# Patient Record
Sex: MALE | Race: BLACK OR AFRICAN AMERICAN | Employment: FULL TIME | URBAN - METROPOLITAN AREA
[De-identification: names, ages, dates, MRNs, and addresses within clinical notes are randomized per-mention and may not be internally consistent; named-entity substitution may affect disease eponyms.]

---

## 2017-01-05 ENCOUNTER — APPOINTMENT (OUTPATIENT)
Dept: OTHER | Facility: HOSPITAL | Age: 32
End: 2017-01-05
Attending: ORTHOPAEDIC SURGERY

## 2017-01-24 ENCOUNTER — HOSPITAL ENCOUNTER (OUTPATIENT)
Dept: GENERAL RADIOLOGY | Facility: HOSPITAL | Age: 32
Discharge: HOME | End: 2017-01-24
Attending: EMERGENCY MEDICINE

## 2017-01-24 ENCOUNTER — OFFICE VISIT (OUTPATIENT)
Dept: OTHER | Facility: HOSPITAL | Age: 32
End: 2017-01-24
Attending: EMERGENCY MEDICINE

## 2017-01-24 DIAGNOSIS — R52 PAIN: ICD-10-CM

## 2017-01-24 DIAGNOSIS — R52 PAIN: Primary | ICD-10-CM

## 2017-01-24 PROCEDURE — 73140 X-RAY EXAM OF FINGER(S): CPT

## 2017-02-21 ENCOUNTER — HOSPITAL ENCOUNTER (OUTPATIENT)
Dept: GENERAL RADIOLOGY | Facility: HOSPITAL | Age: 32
Discharge: HOME | End: 2017-02-21
Attending: EMERGENCY MEDICINE

## 2017-02-21 ENCOUNTER — OFFICE VISIT (OUTPATIENT)
Dept: OTHER | Facility: HOSPITAL | Age: 32
End: 2017-02-21
Attending: EMERGENCY MEDICINE
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
## 2017-02-21 PROCEDURE — 73140 X-RAY EXAM OF FINGER(S): CPT

## 2017-03-02 ENCOUNTER — HOSPITAL ENCOUNTER (OUTPATIENT)
Dept: GENERAL RADIOLOGY | Age: 32
Discharge: HOME | End: 2017-03-02
Attending: EMERGENCY MEDICINE

## 2017-03-02 ENCOUNTER — OFFICE VISIT (OUTPATIENT)
Dept: OCCUPATIONAL MEDICINE | Age: 32
End: 2017-03-02
Attending: EMERGENCY MEDICINE
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
## 2017-03-02 PROCEDURE — 73140 X-RAY EXAM OF FINGER(S): CPT

## 2017-03-03 ENCOUNTER — APPOINTMENT (OUTPATIENT)
Dept: OTHER | Facility: HOSPITAL | Age: 32
End: 2017-03-03
Attending: EMERGENCY MEDICINE

## 2019-03-17 ENCOUNTER — HOSPITAL ENCOUNTER (EMERGENCY)
Facility: HOSPITAL | Age: 34
Discharge: HOME | End: 2019-03-17
Attending: PHYSICIAN ASSISTANT
Payer: MEDICAID

## 2019-03-17 VITALS
DIASTOLIC BLOOD PRESSURE: 88 MMHG | HEART RATE: 99 BPM | BODY MASS INDEX: 33.79 KG/M2 | TEMPERATURE: 98 F | OXYGEN SATURATION: 98 % | SYSTOLIC BLOOD PRESSURE: 138 MMHG | HEIGHT: 70 IN | RESPIRATION RATE: 18 BRPM | WEIGHT: 236 LBS

## 2019-03-17 DIAGNOSIS — H60.501 ACUTE OTITIS EXTERNA OF RIGHT EAR, UNSPECIFIED TYPE: Primary | ICD-10-CM

## 2019-03-17 DIAGNOSIS — J02.9 ACUTE VIRAL PHARYNGITIS: ICD-10-CM

## 2019-03-17 DIAGNOSIS — R03.0 ELEVATED BLOOD PRESSURE READING: ICD-10-CM

## 2019-03-17 LAB — S PYO AG THROAT QL: NEGATIVE

## 2019-03-17 PROCEDURE — 99283 EMERGENCY DEPT VISIT LOW MDM: CPT

## 2019-03-17 PROCEDURE — 87430 STREP A AG IA: CPT

## 2019-03-17 RX ORDER — IBUPROFEN 600 MG/1
TABLET ORAL
Qty: 20 TABLET | Refills: 0 | Status: SHIPPED | OUTPATIENT
Start: 2019-03-17 | End: 2020-09-30

## 2019-03-17 RX ORDER — CIPROFLOXACIN AND DEXAMETHASONE 3; 1 MG/ML; MG/ML
4 SUSPENSION/ DROPS AURICULAR (OTIC) 2 TIMES DAILY
Qty: 1 BOTTLE | Refills: 0 | Status: SHIPPED | OUTPATIENT
Start: 2019-03-17 | End: 2019-03-24

## 2019-03-17 RX ORDER — IBUPROFEN 600 MG/1
600 TABLET ORAL ONCE
Status: COMPLETED | OUTPATIENT
Start: 2019-03-17 | End: 2019-03-17

## 2019-03-17 NOTE — ED NOTES
Pt states having Bilateral ear pain, rt worse than left started 6 days ago, and sore throat. Fever unknown but states he was sweating last night. Denies sick contacts. States took [employer] yesterday for pain, with some relief, but nothing today.

## 2019-03-17 NOTE — ED PROVIDER NOTES
Patient Seen in: [facility] AND [facility] Emergency Department    History   Patient presents with:  Ear Problem Pain (neurosensory)    Stated Complaint: Ear pain/sore throat     HPI    59-year-old male presents with chief complaint of sore throat.   Onset yester 98%   BMI 33.86 kg/m²     PULSE OX within normal limits on room air as interpreted by this provider. Constitutional: The patient is cooperative. Appears well-developed and well-nourished. Mild discomfort.   Psychological: Alert, No abnormalities of mood reading in the Emergency Department. They were informed of the dangers of undiagnosed and untreated hypertension.   Education regarding lifestyle modifications and the need for appropriate follow-up with their PCP to have their blood pressure re-checked wi

## 2020-09-18 ENCOUNTER — APPOINTMENT (OUTPATIENT)
Dept: GENERAL RADIOLOGY | Facility: HOSPITAL | Age: 35
End: 2020-09-18
Attending: EMERGENCY MEDICINE
Payer: COMMERCIAL

## 2020-09-18 ENCOUNTER — HOSPITAL ENCOUNTER (EMERGENCY)
Facility: HOSPITAL | Age: 35
Discharge: HOME | End: 2020-09-18
Attending: EMERGENCY MEDICINE
Payer: COMMERCIAL

## 2020-09-18 VITALS
DIASTOLIC BLOOD PRESSURE: 70 MMHG | RESPIRATION RATE: 84 BRPM | HEART RATE: 84 BPM | SYSTOLIC BLOOD PRESSURE: 130 MMHG | TEMPERATURE: 98 F

## 2020-09-18 DIAGNOSIS — M54.12 CERVICAL RADICULOPATHY: Primary | ICD-10-CM

## 2020-09-18 PROCEDURE — 72040 X-RAY EXAM NECK SPINE 2-3 VW: CPT | Performed by: EMERGENCY MEDICINE

## 2020-09-18 PROCEDURE — 99283 EMERGENCY DEPT VISIT LOW MDM: CPT

## 2020-09-18 RX ORDER — METHYLPREDNISOLONE 4 MG/1
TABLET ORAL
Qty: 1 PACKAGE | Refills: 0 | Status: SHIPPED | OUTPATIENT
Start: 2020-09-18 | End: 2020-09-30

## 2020-09-18 RX ORDER — IBUPROFEN 600 MG/1
600 TABLET ORAL ONCE
Status: COMPLETED | OUTPATIENT
Start: 2020-09-18 | End: 2020-09-18

## 2020-09-18 RX ORDER — CYCLOBENZAPRINE HCL 10 MG
10 TABLET ORAL 3 TIMES DAILY PRN
Qty: 20 TABLET | Refills: 0 | Status: SHIPPED | OUTPATIENT
Start: 2020-09-18 | End: 2020-09-25

## 2020-09-18 NOTE — ED INITIAL ASSESSMENT (HPI)
States that he started to have Rt sided neck pain that radiates down the rt arm. No trauma.  No fever

## 2020-09-18 NOTE — ED PROVIDER NOTES
Patient Seen in: [facility] AND [facility] Emergency Department      History   Patient presents with:  Musculoskeletal Problem    Stated Complaint: right sd tingling    HPI    The patient is a 13-year-old male who presents the patient is a 13-year-old male who p (Right-sided neck pain when turning right-sided neck pain when turning to her right) and muscular tenderness (Right trapezius tenderness and spasm right trapezius tenderness and spasm) present. No spinous process tenderness.       Vascular: No carotid bruit possible for a visit            Medications Prescribed:  Current Discharge Medication List    START taking these medications    methylPREDNISolone 4 MG Oral Tablet Therapy Pack  Dosepack: use as directed on packaging  Qty: 1 Package Refills: 0    cyclobenz

## 2020-09-30 ENCOUNTER — OFFICE VISIT (OUTPATIENT)
Dept: NEUROLOGY | Facility: CLINIC | Age: 35
End: 2020-09-30
Payer: COMMERCIAL

## 2020-09-30 VITALS — BODY MASS INDEX: 34 KG/M2 | HEIGHT: 70 IN

## 2020-09-30 DIAGNOSIS — R20.2 NUMBNESS AND TINGLING OF RIGHT ARM: ICD-10-CM

## 2020-09-30 DIAGNOSIS — M54.12 RIGHT CERVICAL RADICULOPATHY: Primary | ICD-10-CM

## 2020-09-30 DIAGNOSIS — R20.0 NUMBNESS AND TINGLING OF RIGHT ARM: ICD-10-CM

## 2020-09-30 PROCEDURE — 99244 OFF/OP CNSLTJ NEW/EST MOD 40: CPT | Performed by: PHYSICAL MEDICINE & REHABILITATION

## 2020-09-30 RX ORDER — NAPROXEN 500 MG/1
1 TABLET ORAL 2 TIMES DAILY PRN
COMMUNITY
Start: 2020-09-23

## 2020-09-30 RX ORDER — CYCLOBENZAPRINE HCL 10 MG
10 TABLET ORAL 3 TIMES DAILY PRN
COMMUNITY

## 2020-09-30 NOTE — PROGRESS NOTES
[address] [facility]  NEW PATIENT EVALUATION    Consultation as a request of Dr. Shawanda Floyd    Chief Complaint: Neck pain.     HISTORY OF PRESENT ILLNESS:   Patient presents with:  Arm Pain: Patient presents today c/o ti pertinent past medical history.       PAST SURGICAL HISTORY:     Past Surgical History:   Procedure Laterality Date   • ANESTH,SURGERY OF SHOULDER Left 2011    labrum repair          CURRENT MEDICATIONS:     Current Outpatient Medications   Medication Sig D hematoma or deformities  Mouth: No lesions or ulcerations  Heart: peripheral pulses intact. Normal capillary refill.    Lungs: Non-labored respirations  Abdomen: No abdominal guarding  Extremities: No lower extremity edema bilaterally   Skin: No lesions not has is on a well to Medrol Dosepak and has been started on physical therapy. Overall, patient is noticing improvement however continues to have numbness tingling down the arm.   He does have intact strength sensation reflexes today and I do suspect that he

## 2020-09-30 NOTE — PATIENT INSTRUCTIONS
-Start physical therapy and home exercises  -Naprosyn daily  -Ice/Heat as tolerated  -Please stop the medication if you have any side effects and call the office if you have any questions or concerns  -If no better will consider MRI for further evaluation

## 2021-04-09 DIAGNOSIS — Z23 NEED FOR VACCINATION: ICD-10-CM

## 2021-04-22 ENCOUNTER — OFFICE VISIT (OUTPATIENT)
Dept: NEUROLOGY | Facility: CLINIC | Age: 36
End: 2021-04-22
Payer: COMMERCIAL

## 2021-04-22 ENCOUNTER — TELEPHONE (OUTPATIENT)
Dept: NEUROLOGY | Facility: CLINIC | Age: 36
End: 2021-04-22

## 2021-04-22 VITALS
BODY MASS INDEX: 35.07 KG/M2 | HEIGHT: 70 IN | HEART RATE: 97 BPM | WEIGHT: 245 LBS | OXYGEN SATURATION: 98 % | DIASTOLIC BLOOD PRESSURE: 88 MMHG | SYSTOLIC BLOOD PRESSURE: 120 MMHG

## 2021-04-22 DIAGNOSIS — R20.0 NUMBNESS AND TINGLING OF RIGHT ARM: ICD-10-CM

## 2021-04-22 DIAGNOSIS — R20.2 NUMBNESS AND TINGLING OF RIGHT ARM: ICD-10-CM

## 2021-04-22 DIAGNOSIS — M54.12 RIGHT CERVICAL RADICULOPATHY: Primary | ICD-10-CM

## 2021-04-22 PROCEDURE — 99214 OFFICE O/P EST MOD 30 MIN: CPT | Performed by: PHYSICAL MEDICINE & REHABILITATION

## 2021-04-22 PROCEDURE — 3008F BODY MASS INDEX DOCD: CPT | Performed by: PHYSICAL MEDICINE & REHABILITATION

## 2021-04-22 PROCEDURE — 3074F SYST BP LT 130 MM HG: CPT | Performed by: PHYSICAL MEDICINE & REHABILITATION

## 2021-04-22 PROCEDURE — 3079F DIAST BP 80-89 MM HG: CPT | Performed by: PHYSICAL MEDICINE & REHABILITATION

## 2021-04-22 NOTE — TELEPHONE ENCOUNTER
Contacted [employer] online to initiate authorization for C-Spine MRI CPT [id number] to be done at [address] [place] [address].     Status: Approved with order #[id number] valid 4/22/21-5/21/21    All Approved CPT codes with this authhorization are [id number], [id number] and [phone] *[id number], *[id number]    [employer]

## 2021-04-22 NOTE — PROGRESS NOTES
[address] [facility]  FOLLOW UP EVALUATION      Chief Complaint: Neck pain.     HISTORY OF PRESENT ILLNESS:   Patient presents with:  Neck Pain: LOV: 9/30/20 Pt f/u on R neck pain that radiates to hand(1-5) with N/T. advised to follow-up. He completed a Medrol Dosepak which is improved his symptoms tremendously. However he continues to experience numbness tingling sensation down his arm.   He denies any weakness, any loss of bowel bladder control, any fevers chills or Respiratory  Painful Breathing: denies  Wheezing: denies   Gastrointestinal  Bowel Incontinence: denies  Heartburn: denies  Abdominal Pain: denies  Blood in Stool : denies  Rectal Pain: denies   Hematology  Easy Bruising: denies  Easy Bleeding: denies bilateral upper extremities except decreased to light touch in the right C6 and 7 dermatome  Reflexes: 1/4 at C5, C6, C7 bilaterally  Spurling Test: Positive for radicular symptoms down either extremity bilaterally  Callaway's sign: negative bilaterally

## 2021-05-13 ENCOUNTER — HOSPITAL ENCOUNTER (OUTPATIENT)
Dept: MRI IMAGING | Facility: HOSPITAL | Age: 36
Discharge: HOME | End: 2021-05-13
Attending: PHYSICAL MEDICINE & REHABILITATION
Payer: COMMERCIAL

## 2021-05-13 DIAGNOSIS — M54.12 RIGHT CERVICAL RADICULOPATHY: ICD-10-CM

## 2021-05-13 PROCEDURE — 72141 MRI NECK SPINE W/O DYE: CPT | Performed by: PHYSICAL MEDICINE & REHABILITATION

## 2021-05-18 ENCOUNTER — TELEPHONE (OUTPATIENT)
Dept: NEUROLOGY | Facility: CLINIC | Age: 36
End: 2021-05-18

## 2021-05-18 NOTE — TELEPHONE ENCOUNTER
----- Message from Derek Ruelas DO sent at 5/14/2021 12:48 PM CDT -----  MRI shows a mild disc herniation at C5-6 and stenosis at C6-7 right-sided. Please have him follow-up as discussed.

## 2021-05-28 ENCOUNTER — TELEMEDICINE (OUTPATIENT)
Dept: NEUROLOGY | Facility: CLINIC | Age: 36
End: 2021-05-28

## 2021-05-28 DIAGNOSIS — R20.0 NUMBNESS AND TINGLING OF RIGHT ARM: ICD-10-CM

## 2021-05-28 DIAGNOSIS — R20.2 NUMBNESS AND TINGLING OF RIGHT ARM: ICD-10-CM

## 2021-05-28 DIAGNOSIS — M54.12 CERVICAL RADICULOPATHY: Primary | ICD-10-CM

## 2021-05-28 PROCEDURE — 99214 OFFICE O/P EST MOD 30 MIN: CPT | Performed by: PHYSICAL MEDICINE & REHABILITATION

## 2021-05-28 NOTE — PROGRESS NOTES
[address]    Telemedicine Visit - Follow Up Evaluation    Telehealth Verbal Consent   I conducted a telehealth visit with Delisa Ventura today, 05/28/21, which was completed using two-way, real-time in previous to the MRI imaging he felt like he was near close to his baseline. He has not been working in his normal work duty fashion over the last 3 months however and he is unclear whether this is helpful in relieving his pain.   However after the MRI imag follow commands, comprehention intact, spontaneous speech intact  Psychiatric: Mood and affect appropriate    Musculoskeletal Exam:    Patient is sitting without significant discomfort        LABS:   No results found for: EAG, A1C  No results found for: WB treatment. We also discussed that NSAIDs may mask or worsen COVID-19 infection symptoms. The patient was also informed that corticosteroids, in any form, may significantly decrease immune response and may increase risk and complications of infection.

## 2025-03-06 ENCOUNTER — HOSPITAL ENCOUNTER (OUTPATIENT)
Dept: GENERAL RADIOLOGY | Age: 40
Discharge: HOME | End: 2025-03-06
Attending: FAMILY MEDICINE
Payer: COMMERCIAL

## 2025-03-06 DIAGNOSIS — M79.645 PAIN IN FINGER OF LEFT HAND: ICD-10-CM

## 2025-03-06 PROCEDURE — 73130 X-RAY EXAM OF HAND: CPT | Performed by: FAMILY MEDICINE

## (undated) NOTE — Clinical Note
4-week follow-up in office. Please provide patient a work note to return back to work full duty no restrictions starting May 30. Also please fax patient's PT [facility].

## (undated) NOTE — LETTER
21          Khurram Solis  :  1985      To Whom It May Concern: This patient was seen in our office on 21 . Work status:   May return to work full-time with restrictions only sedentary work: no heavy lifting, no operating heavy [employer]

## (undated) NOTE — LETTER
21          Pratima Marques  :  1985      To Whom It May Concern: This patient was seen on 21. Work status: May return to work full-time, no restrictions. May return to work status per above effective 21.     If this of

## (undated) NOTE — LETTER
21          Kongjesenia Brownjose Marques  :  1985      To Whom It May Concern: This patient was seen in our office on 21 . Work status:  {LACHO RETURN TO JOANA:[id number]}    May return to work status per above effective ***.     If this office may b

## (undated) NOTE — ED AVS SNAPSHOT
Clementine Arrieta   MRN: [id number]    Department:  [facility] Emergency Department   Date of Visit:  3/17/2019           Disclosure     Insurance plans vary and the physician(s) referred by the ER may not be covered by your plan.  Please contact you CARE PHYSICIAN AT ONCE OR RETURN IMMEDIATELY TO THE EMERGENCY DEPARTMENT. If you have been prescribed any medication(s), please fill your prescription right away and begin taking the medication(s) as directed.   If you believe that any of the medications